# Patient Record
Sex: MALE | Race: WHITE | URBAN - NONMETROPOLITAN AREA
[De-identification: names, ages, dates, MRNs, and addresses within clinical notes are randomized per-mention and may not be internally consistent; named-entity substitution may affect disease eponyms.]

---

## 2017-01-01 ENCOUNTER — OFFICE VISIT (OUTPATIENT)
Dept: FAMILY MEDICINE | Facility: OTHER | Age: 77
End: 2017-01-01
Payer: MEDICARE

## 2017-01-01 VITALS
HEIGHT: 67 IN | HEART RATE: 60 BPM | OXYGEN SATURATION: 95 % | SYSTOLIC BLOOD PRESSURE: 116 MMHG | TEMPERATURE: 97.4 F | DIASTOLIC BLOOD PRESSURE: 70 MMHG | WEIGHT: 147.4 LBS | BODY MASS INDEX: 23.13 KG/M2

## 2017-01-01 DIAGNOSIS — C25.0 MALIGNANT NEOPLASM OF HEAD OF PANCREAS (H): Primary | ICD-10-CM

## 2017-01-01 DIAGNOSIS — Z71.89 ADVANCED DIRECTIVES, COUNSELING/DISCUSSION: ICD-10-CM

## 2017-01-01 PROCEDURE — 99202 OFFICE O/P NEW SF 15 MIN: CPT | Performed by: INTERNAL MEDICINE

## 2017-01-01 ASSESSMENT — PAIN SCALES - GENERAL: PAINLEVEL: NO PAIN (0)

## 2017-04-14 PROBLEM — C25.0 MALIGNANT NEOPLASM OF HEAD OF PANCREAS (H): Status: ACTIVE | Noted: 2017-01-01

## 2017-04-14 PROBLEM — Z71.89 ADVANCED DIRECTIVES, COUNSELING/DISCUSSION: Status: ACTIVE | Noted: 2017-01-01

## 2017-04-14 NOTE — PROGRESS NOTES
SUBJECTIVE:                                                    Chapo Garcia is a 77 year old male who presents to clinic today for the following health issues:    New Patient/Transfer of Care      CHIEF COMPLAINT:    The patient is a 77-year-old gentleman who was recently diagnosed as having a large pancreatic mass. A stent was placed as he was turning banana yellow and he is now less yellow. This was performed through the [facility] in [place]. He is now moving to the area. He states that he has a follow-up in [place] next Monday. He will have lab work performed at that time. We've had a lengthy discussion with the patient at the request of his sisters to discuss the acuity and the time sensitive nature of his disease. I have explained to him that he has a 90+ percent chance of having pancreas cancer despite the fact the biopsy has not yet returned. I have also explained to him that he cannot waste time in setting of medical care. He would like to continue his care through the [facility] for cost containment purposes. He states that he simply here today for a second opinion and to establish primary care in the [facility]. His past medical history is unremarkable with the exception that he rides his bicycle literally thousands upon thousands of miles per year. He is a self-proclaimed best cyclist in the world. He is also extremely echo friendly and has explained to me the evil natures our society. I have explained to him that he probably has pancreas cancer and is going to die if he does not get a doctor immediately. As such, they're going to go to the [facility] in [place] today to try to transfer his records and establish an initial appointment. I will be available for all of his emergent needs and questions as they arise. At this time, he has no intention of establishing further diagnostic workup or treatment through private facilities such as  [facility].      20  minutes were spent with patient and greater than 90 % of the time was spent counseling patient and coordinating,as well as educating the patient on his disease/health.      I will be available for him.  Diagnosis: Pancreatic cancer.  Amelia

## 2017-04-14 NOTE — MR AVS SNAPSHOT
"              After Visit Summary   2017    Chapo Garcia    MRN: [id number]           Patient Information     Date Of Birth          1940        Visit Information        Provider Department      2017 7:00 AM Feliciano Cisneros DO [facility]        Today's Diagnoses     Malignant neoplasm of head of pancreas (H)    -  1    Advanced directives, counseling/discussion           Follow-ups after your visit        Who to contact     If you have questions or need follow up information about today's clinic visit or your schedule please contact [facility] directly at [phone].  Normal or non-critical lab and imaging results will be communicated to you by MyChart, letter or phone within 4 business days after the clinic has received the results. If you do not hear from us within 7 days, please contact the clinic through [employer] or phone. If you have a critical or abnormal lab result, we will notify you by phone as soon as possible.  Submit refill requests through [employer] or call your pharmacy and they will forward the refill request to us. Please allow 3 business days for your refill to be completed.          Additional Information About Your Visit        MyChart Information     [employer] lets you send messages to your doctor, view your test results, renew your prescriptions, schedule appointments and more. To sign up, go to [email or web address] . Click on \"Log in\" on the left side of the screen, which will take you to the Welcome page. Then click on \"Sign up Now\" on the right side of the page.     You will be asked to enter the access code listed below, as well as some personal information. Please follow the directions to create your username and password.     Your access code is: [email or web address]  Expires: 2017  4:04 PM     Your access code will  in 90 days. If you need help or a new code, please call your [facility] or [phone].        Care EveryWhere " "ID     This is your Care EveryWhere ID. This could be used by other organizations to access your [place] medical records  [id number]        Your Vitals Were     Pulse Temperature Height Pulse Oximetry BMI (Body Mass Index)       60 97.4  F (36.3  C) (Temporal) 5' 7\" (1.702 m) 95% 23.09 kg/m2        Blood Pressure from Last 3 Encounters:   04/14/17 116/70    Weight from Last 3 Encounters:   04/14/17 147 lb 6.4 oz (66.9 kg)              Today, you had the following     No orders found for display       Primary Care Provider    None Specified       No primary provider on file.        Thank you!     Thank you for choosing [facility]  for your care. Our goal is always to provide you with excellent care. Hearing back from our patients is one way we can continue to improve our services. Please take a few minutes to complete the written survey that you may receive in the mail after your visit with us. Thank you!             Your Updated Medication List - Protect others around you: Learn how to safely use, store and throw away your medicines at www.disposemymeds.org.      Notice  As of 4/14/2017 11:59 PM    You have not been prescribed any medications.      "

## 2017-04-14 NOTE — NURSING NOTE
"Chief Complaint   Patient presents with     Establish Care       Initial /70 (BP Location: Right arm, Patient Position: Chair, Cuff Size: Adult Regular)  Pulse 60  Temp 97.4  F (36.3  C) (Temporal)  Ht 5' 7\" (1.702 m)  Wt 147 lb 6.4 oz (66.9 kg)  SpO2 95%  BMI 23.09 kg/m2 Estimated body mass index is 23.09 kg/(m^2) as calculated from the following:    Height as of this encounter: 5' 7\" (1.702 m).    Weight as of this encounter: 147 lb 6.4 oz (66.9 kg).  Medication Reconciliation: complete  Health Maintenance reviewed at today's visit patient asked to schedule/complete:   Colon Cancer:  Patient declined   Mary Jo MALCOLM  '  "

## 2024-06-17 PROBLEM — Z71.89 ADVANCED DIRECTIVES, COUNSELING/DISCUSSION: Status: RESOLVED | Noted: 2017-01-01 | Resolved: 2024-06-17
